# Patient Record
Sex: FEMALE | Race: WHITE | Employment: FULL TIME | URBAN - METROPOLITAN AREA
[De-identification: names, ages, dates, MRNs, and addresses within clinical notes are randomized per-mention and may not be internally consistent; named-entity substitution may affect disease eponyms.]

---

## 2017-01-03 ENCOUNTER — OFFICE VISIT (OUTPATIENT)
Dept: INTERNAL MEDICINE CLINIC | Facility: CLINIC | Age: 32
End: 2017-01-03

## 2017-01-03 ENCOUNTER — HOSPITAL ENCOUNTER (OUTPATIENT)
Dept: GENERAL RADIOLOGY | Age: 32
Discharge: HOME | End: 2017-01-03
Attending: INTERNAL MEDICINE
Payer: COMMERCIAL

## 2017-01-03 VITALS
DIASTOLIC BLOOD PRESSURE: 73 MMHG | SYSTOLIC BLOOD PRESSURE: 128 MMHG | OXYGEN SATURATION: 99 % | BODY MASS INDEX: 31.24 KG/M2 | TEMPERATURE: 99 F | RESPIRATION RATE: 16 BRPM | HEART RATE: 88 BPM | WEIGHT: 183 LBS | HEIGHT: 64 IN

## 2017-01-03 DIAGNOSIS — M79.18 PAIN IN LEFT BUTTOCK: ICD-10-CM

## 2017-01-03 DIAGNOSIS — Z32.00 ENCOUNTER FOR PREGNANCY TEST, RESULT UNKNOWN: ICD-10-CM

## 2017-01-03 DIAGNOSIS — J01.90 ACUTE NON-RECURRENT SINUSITIS, UNSPECIFIED LOCATION: Primary | ICD-10-CM

## 2017-01-03 LAB
CONTROL LINE PRESENT WITH A CLEAR BACKGROUND (YES/NO): YES YES/NO
KIT LOT #: NORMAL NUMERIC
PREGNANCY TEST, URINE: NEGATIVE

## 2017-01-03 PROCEDURE — 81025 URINE PREGNANCY TEST: CPT | Performed by: INTERNAL MEDICINE

## 2017-01-03 PROCEDURE — 99214 OFFICE O/P EST MOD 30 MIN: CPT | Performed by: INTERNAL MEDICINE

## 2017-01-03 PROCEDURE — 73502 X-RAY EXAM HIP UNI 2-3 VIEWS: CPT

## 2017-01-03 PROCEDURE — 99213 OFFICE O/P EST LOW 20 MIN: CPT | Performed by: INTERNAL MEDICINE

## 2017-01-03 RX ORDER — HYDROCODONE BITARTRATE AND ACETAMINOPHEN 5; 325 MG/1; MG/1
1 TABLET ORAL EVERY 6 HOURS PRN
Qty: 30 TABLET | Refills: 0 | Status: SHIPPED | OUTPATIENT
Start: 2017-01-03 | End: 2017-01-30

## 2017-01-03 NOTE — PROGRESS NOTES
HPI:    Patient ID: Yanet Duran is a 28year old female. Fall  The accident occurred 3 to 5 days ago. The fall occurred while walking. She fell from a height of 1 to 2 ft. She landed on concrete. There was no blood loss.  The point of impact was th for Pain. Disp: 30 tablet Rfl: 0   ClonazePAM 0.5 MG Oral Tab Take 1 tablet daily as needed for anxiety and sleep Disp:  Rfl: 2   Zolpidem Tartrate 10 MG Oral Tab Take 1 tablet (10 mg total) by mouth nightly as needed.  Disp: 30 tablet Rfl: 0   amphetamine- and no deformity. Lumbar back: Normal.   Lymphadenopathy:     She has no cervical adenopathy. Neurological: She is alert. She has normal strength. No sensory deficit.    Reflex Scores:       Patellar reflexes are 2+ on the right side and 2+ on the

## 2017-01-25 ENCOUNTER — PATIENT MESSAGE (OUTPATIENT)
Dept: INTERNAL MEDICINE CLINIC | Facility: CLINIC | Age: 32
End: 2017-01-25

## 2017-01-25 ENCOUNTER — TELEPHONE (OUTPATIENT)
Dept: INTERNAL MEDICINE CLINIC | Facility: CLINIC | Age: 32
End: 2017-01-25

## 2017-01-25 ENCOUNTER — HOSPITAL ENCOUNTER (EMERGENCY)
Facility: HOSPITAL | Age: 32
Discharge: HOME | End: 2017-01-25
Payer: COMMERCIAL

## 2017-01-25 ENCOUNTER — APPOINTMENT (OUTPATIENT)
Dept: GENERAL RADIOLOGY | Facility: HOSPITAL | Age: 32
End: 2017-01-25
Attending: PHYSICIAN ASSISTANT
Payer: COMMERCIAL

## 2017-01-25 VITALS
BODY MASS INDEX: 31.76 KG/M2 | DIASTOLIC BLOOD PRESSURE: 76 MMHG | SYSTOLIC BLOOD PRESSURE: 131 MMHG | HEART RATE: 80 BPM | OXYGEN SATURATION: 98 % | HEIGHT: 64 IN | WEIGHT: 186 LBS | TEMPERATURE: 98 F | RESPIRATION RATE: 16 BRPM

## 2017-01-25 DIAGNOSIS — M54.40 CHRONIC LOW BACK PAIN WITH SCIATICA, SCIATICA LATERALITY UNSPECIFIED, UNSPECIFIED BACK PAIN LATERALITY: Primary | ICD-10-CM

## 2017-01-25 DIAGNOSIS — M54.40 BACK PAIN OF LUMBAR REGION WITH SCIATICA: Primary | ICD-10-CM

## 2017-01-25 DIAGNOSIS — G89.29 CHRONIC LOW BACK PAIN WITH SCIATICA, SCIATICA LATERALITY UNSPECIFIED, UNSPECIFIED BACK PAIN LATERALITY: Primary | ICD-10-CM

## 2017-01-25 PROCEDURE — 99283 EMERGENCY DEPT VISIT LOW MDM: CPT

## 2017-01-25 PROCEDURE — 72110 X-RAY EXAM L-2 SPINE 4/>VWS: CPT

## 2017-01-25 RX ORDER — HYDROCODONE BITARTRATE AND ACETAMINOPHEN 5; 325 MG/1; MG/1
1-2 TABLET ORAL EVERY 6 HOURS PRN
Qty: 20 TABLET | Refills: 0 | Status: SHIPPED | OUTPATIENT
Start: 2017-01-25 | End: 2017-02-04

## 2017-01-25 RX ORDER — ORPHENADRINE CITRATE 100 MG/1
100 TABLET, EXTENDED RELEASE ORAL 2 TIMES DAILY
Qty: 10 TABLET | Refills: 0 | Status: SHIPPED | OUTPATIENT
Start: 2017-01-25 | End: 2017-02-01

## 2017-01-25 RX ORDER — METHYLPREDNISOLONE 4 MG/1
TABLET ORAL
Qty: 1 PACKAGE | Refills: 0 | Status: SHIPPED | OUTPATIENT
Start: 2017-01-25 | End: 2017-01-30

## 2017-01-25 RX ORDER — HYDROCODONE BITARTRATE AND ACETAMINOPHEN 5; 325 MG/1; MG/1
2 TABLET ORAL ONCE
Status: COMPLETED | OUTPATIENT
Start: 2017-01-25 | End: 2017-01-25

## 2017-01-25 NOTE — TELEPHONE ENCOUNTER
Patient stated her lower back pain is so bad can not even find a comfortable position lying. Her boyfriend has to help her walk and the pain is radiating down her legs and causing twitching. She was crying on the phone her pain is so bad.    Instructed n

## 2017-01-25 NOTE — TELEPHONE ENCOUNTER
Patient said that her pain is worse pain in my lower back, radiates through both hips and  both thighs. Has apt tomorrow but cannot wait to get medication. Asking if he can give her  medication till then.

## 2017-01-25 NOTE — ED PROVIDER NOTES
Patient has a history of known disc disease. She had an MRI scan last year. Patient had a fall. She was seen by her primary care doctor after the fall. Patient has had persistent pains.   She reports pain across the lower back bilaterally with some radi

## 2017-01-25 NOTE — ED INITIAL ASSESSMENT (HPI)
Pt has hx of degenerative disk disease, with back pain, today increased in pain radiating to hips and legs, needing help out of bed.

## 2017-01-25 NOTE — ED PROVIDER NOTES
Patient Seen in: [facility] Emergency Department    History   Patient presents with:  Back Pain (musculoskeletal)    Stated Complaint: back pain    HPI    50-year-old female with a history of herniated disks in her L4-L5 and L5-S1 presents to the [facility] Tab,  Take 1 tablet (10 mg total) by mouth 2 (two) times daily. alprazolam 0.25 MG Oral Tab,  Take 1 tablet by mouth as needed.        Family History   Problem Relation Age of Onset   • Cancer Mother      Lymphoma          Smoking Status: Never Smoker Skin is warm and dry. Psychiatric: She has a normal mood and affect.  Her behavior is normal. Thought content normal.             ED Course   Labs Reviewed - No data to display  XR LUMBAR SPINE (MIN 4 VIEWS) (CPT=72110) (Final result) Result time: 01/25/1 Medication List as of 1/25/2017  3:59 PM    START taking these medications    methylPREDNISolone 4 MG Oral Tablet Therapy Pack  Dosepack: use as directed on packaging, Normal, Disp-1 Package, R-0    Orphenadrine Citrate  MG Oral Tablet 12 Hr  Take 10

## 2017-01-26 ENCOUNTER — TELEPHONE (OUTPATIENT)
Dept: INTERNAL MEDICINE CLINIC | Facility: CLINIC | Age: 32
End: 2017-01-26

## 2017-01-26 DIAGNOSIS — S39.012A STRAIN, BACK, INITIAL ENCOUNTER: Primary | ICD-10-CM

## 2017-01-26 NOTE — TELEPHONE ENCOUNTER
Patient states she needs medication for her back pain there is no available appt.  With Dr. Dayron Wilson   She only has medication to last her until Sunday 1/29/2016 she states that's all the hospital will prescribe   Patient wants to know if Dr. Dayron Wilson can Aimee Duque

## 2017-01-26 NOTE — TELEPHONE ENCOUNTER
Spoke to patient, recommended to have MRI of the lumbar spine, provided him with referral to pain service, advised her that she should follow-up with me in the office taking that he will Monday if needed for evaluation, she probably will not be able to see

## 2017-01-26 NOTE — TELEPHONE ENCOUNTER
Pt said she just spoke with Dr Asya Rich- said she is to be added to her schedule on Monday 1/30- nothing in Epic  Pt said she prefers afternoon or at end of Dr Dennys Almeida day   only same day slots available

## 2017-01-26 NOTE — TELEPHONE ENCOUNTER
Patient checking on status of referral advise 3-5 days turn around   Patient states she is in a lot of pain and she only has medication for pain to last her thru Sunday  Any help in speeding up the process will be appreciated   Explained insurance has to a

## 2017-01-26 NOTE — TELEPHONE ENCOUNTER
Patient called to report that she was seen in the ED for her pain. She has verbalized urgency in getting referral to pain management to follow-up related to her ED visit. Separate encounter was started for that. Please advise.  Please call or send message v

## 2017-01-26 NOTE — TELEPHONE ENCOUNTER
X-ray negative for acute process, results discussed with patient.  Patient will begin Medrol Dosepak, muscle relaxers, and Norco as needed for breakthrough pain.  She will follow-up with primary care and her pain specialist.    Pt asking for referral for p

## 2017-01-30 ENCOUNTER — OFFICE VISIT (OUTPATIENT)
Dept: INTERNAL MEDICINE CLINIC | Facility: CLINIC | Age: 32
End: 2017-01-30

## 2017-01-30 VITALS
DIASTOLIC BLOOD PRESSURE: 89 MMHG | WEIGHT: 182 LBS | BODY MASS INDEX: 31 KG/M2 | HEART RATE: 60 BPM | SYSTOLIC BLOOD PRESSURE: 138 MMHG | RESPIRATION RATE: 18 BRPM

## 2017-01-30 DIAGNOSIS — S39.012S LUMBAR STRAIN, SEQUELA: Primary | ICD-10-CM

## 2017-01-30 DIAGNOSIS — M54.16 LUMBAR BACK PAIN WITH RADICULOPATHY AFFECTING LEFT LOWER EXTREMITY: ICD-10-CM

## 2017-01-30 PROBLEM — S39.012A LUMBAR STRAIN: Status: ACTIVE | Noted: 2017-01-30

## 2017-01-30 PROCEDURE — 99212 OFFICE O/P EST SF 10 MIN: CPT | Performed by: INTERNAL MEDICINE

## 2017-01-30 PROCEDURE — 99214 OFFICE O/P EST MOD 30 MIN: CPT | Performed by: INTERNAL MEDICINE

## 2017-01-30 RX ORDER — HYDROCODONE BITARTRATE AND ACETAMINOPHEN 5; 325 MG/1; MG/1
1 TABLET ORAL EVERY 6 HOURS PRN
Qty: 30 TABLET | Refills: 0 | Status: SHIPPED | OUTPATIENT
Start: 2017-01-30 | End: 2017-02-08

## 2017-01-30 RX ORDER — GABAPENTIN 300 MG/1
CAPSULE ORAL
Qty: 60 CAPSULE | Refills: 0 | Status: SHIPPED | OUTPATIENT
Start: 2017-01-30 | End: 2018-04-10

## 2017-01-30 NOTE — PROGRESS NOTES
HPI:    Patient ID: Jeremías Wyman is a 28year old female presents for follow-up on her low back pain.     HPI  Patient reports that 9 days ago she developed low back pain across lumbar sacral area, with radiation of the pain down the left foot on the p directed on packaging Disp: 1 Package Rfl: 0   Orphenadrine Citrate  MG Oral Tablet 12 Hr Take 100 mg by mouth 2 (two) times daily.  Disp: 10 tablet Rfl: 0   ClonazePAM 0.5 MG Oral Tab Take 1 tablet daily as needed for anxiety and sleep Disp:  Rfl: 2 walk   Skin: Skin is warm and dry. No rash noted. Psychiatric: She has a normal mood and affect.  Her behavior is normal. Judgment and thought content normal.              ASSESSMENT/PLAN:   (S39.012S) Lumbar strain, sequela  (primary encounter diagnosis)

## 2017-02-03 NOTE — PROGRESS NOTES
SEEN BY DR. Rukhsana Khan  she developed low back pain across lumbar sacral area, with radiation of the pain down the left foot on the posterior and anterior thigh.  Patient was seen at the emergency room for evaluation x-ray lumbar spine showed degenerative godoy

## 2017-02-04 ENCOUNTER — HOSPITAL ENCOUNTER (OUTPATIENT)
Dept: MRI IMAGING | Age: 32
Discharge: HOME | End: 2017-02-04
Attending: INTERNAL MEDICINE
Payer: COMMERCIAL

## 2017-02-04 DIAGNOSIS — S39.012A STRAIN, BACK, INITIAL ENCOUNTER: ICD-10-CM

## 2017-02-04 PROCEDURE — 72148 MRI LUMBAR SPINE W/O DYE: CPT

## 2017-02-08 ENCOUNTER — OFFICE VISIT (OUTPATIENT)
Dept: PAIN CLINIC | Facility: HOSPITAL | Age: 32
End: 2017-02-08
Attending: ANESTHESIOLOGY
Payer: COMMERCIAL

## 2017-02-08 VITALS
BODY MASS INDEX: 31.07 KG/M2 | SYSTOLIC BLOOD PRESSURE: 123 MMHG | HEART RATE: 95 BPM | DIASTOLIC BLOOD PRESSURE: 71 MMHG | RESPIRATION RATE: 18 BRPM | WEIGHT: 182 LBS | HEIGHT: 64 IN

## 2017-02-08 DIAGNOSIS — M54.42 ACUTE LEFT-SIDED LOW BACK PAIN WITH LEFT-SIDED SCIATICA: Primary | ICD-10-CM

## 2017-02-08 PROCEDURE — 99201 HC OUTPT EVAL AND MGNT NEW PT LEVEL 1: CPT

## 2017-02-08 RX ORDER — HYDROCODONE BITARTRATE AND ACETAMINOPHEN 7.5; 325 MG/1; MG/1
1 TABLET ORAL
COMMUNITY
Start: 2017-02-08 | End: 2017-02-08

## 2017-02-08 RX ORDER — HYDROCODONE BITARTRATE AND ACETAMINOPHEN 7.5; 325 MG/1; MG/1
1 TABLET ORAL
Qty: 30 TABLET | Refills: 0 | Status: SHIPPED | OUTPATIENT
Start: 2017-02-08 | End: 2018-04-10

## 2017-02-08 NOTE — PROGRESS NOTES
SEEN BY DR. Bob Purvis  she developed low back pain across lumbar sacral area, with radiation of the pain down the left foot on the posterior and anterior thigh.  Patient was seen at the emergency room for evaluation x-ray lumbar spine showed degenerative godoy

## 2017-02-08 NOTE — CHRONIC PAIN
[facility] HOSP - [facility]  Report of Consultation    Abel Miguel Patient Status:  Outpatient    1985 MRN [id number]   Location [address] [facility] Attending Anastacia Gordon MD   Hosp Day #  PCP Alden Crenshaw MD     Da Problem List:     Carpal tunnel syndrome     Cherry angioma     Other acne     Family history of malignant melanoma     Fatigue     Osteoarthritis of spine with radiculopathy, lumbar region     ADHD, adult residual type     Acute gastroenteritis     Alanda Scotia

## 2017-02-15 ENCOUNTER — TELEPHONE (OUTPATIENT)
Dept: PAIN CLINIC | Facility: HOSPITAL | Age: 32
End: 2017-02-15

## 2017-02-15 NOTE — TELEPHONE ENCOUNTER
Patient called to report pain despite taking Norco for pain. Patient reports that she continues to take NSAID. She states \"im taking three advil about four times a day. \" Patient states she is concerned that her pain level will increase following the inje

## 2017-05-26 ENCOUNTER — OFFICE VISIT (OUTPATIENT)
Dept: PAIN CLINIC | Facility: HOSPITAL | Age: 32
End: 2017-05-26
Attending: ANESTHESIOLOGY
Payer: COMMERCIAL

## 2017-05-26 VITALS — BODY MASS INDEX: 29.71 KG/M2 | HEIGHT: 64 IN | WEIGHT: 174 LBS

## 2017-05-26 DIAGNOSIS — M54.40 CHRONIC LOW BACK PAIN WITH SCIATICA, SCIATICA LATERALITY UNSPECIFIED, UNSPECIFIED BACK PAIN LATERALITY: Primary | ICD-10-CM

## 2017-05-26 DIAGNOSIS — G89.29 CHRONIC LOW BACK PAIN WITH SCIATICA, SCIATICA LATERALITY UNSPECIFIED, UNSPECIFIED BACK PAIN LATERALITY: Primary | ICD-10-CM

## 2017-05-26 PROCEDURE — 99211 OFF/OP EST MAY X REQ PHY/QHP: CPT

## 2017-05-26 RX ORDER — METHYLPREDNISOLONE 4 MG/1
4 TABLET ORAL ONCE
Qty: 1 PACKAGE | Refills: 0 | Status: SHIPPED | OUTPATIENT
Start: 2017-05-26 | End: 2017-05-26

## 2017-05-26 RX ORDER — HYDROCODONE BITARTRATE AND ACETAMINOPHEN 10; 325 MG/1; MG/1
1 TABLET ORAL EVERY 6 HOURS PRN
Qty: 30 TABLET | Refills: 0 | Status: SHIPPED | OUTPATIENT
Start: 2017-05-26 | End: 2017-06-25

## 2017-05-26 NOTE — PROGRESS NOTES
Patient presents to [facility] ambulatory for med eval.  We have been treating her for low back pain that radiates to the left buttock and down her left leg with sharp shooting pain. \"It feels like there is a knife in my back. \"  She has pain today at a 7/10 an

## 2017-05-26 NOTE — CHRONIC PAIN
Follow-up Note    HISTORY OF PRESENT ILLNESS:  Abel Miguel is a 28year old old female, returns to the clinic for valuation treatment of her low back pain which radiates down to her left leg primarily in the left buttock down into the back of the leg does not exacerbate the pain.   Numbness/tingling: as above  Weakness: as above  Weight Loss: Negative   Fever: Negative   Cardiovascular:  No current chest pain or palpitations   Respiratory:  No current shortness of breath   Gastrointestinal:  No active u Caffeine Concern Yes    Comment: Soda      Social History Narrative     ADVANCE CARE PLANNING:    Advance Care Plan NOT discussed. PHYSICAL EXAMINATION:  There were no vitals filed for this visit.    General: Alert and oriented x3  Affect:  NAD  CV: RRR

## 2017-12-27 ENCOUNTER — APPOINTMENT (OUTPATIENT)
Dept: GENERAL RADIOLOGY | Facility: HOSPITAL | Age: 32
End: 2017-12-27
Payer: COMMERCIAL

## 2017-12-27 ENCOUNTER — HOSPITAL ENCOUNTER (EMERGENCY)
Facility: HOSPITAL | Age: 32
Discharge: HOME | End: 2017-12-27
Attending: EMERGENCY MEDICINE
Payer: COMMERCIAL

## 2017-12-27 ENCOUNTER — TELEPHONE (OUTPATIENT)
Dept: INTERNAL MEDICINE CLINIC | Facility: CLINIC | Age: 32
End: 2017-12-27

## 2017-12-27 VITALS
WEIGHT: 180 LBS | RESPIRATION RATE: 18 BRPM | TEMPERATURE: 98 F | HEART RATE: 93 BPM | HEIGHT: 65 IN | SYSTOLIC BLOOD PRESSURE: 144 MMHG | DIASTOLIC BLOOD PRESSURE: 69 MMHG | OXYGEN SATURATION: 100 % | BODY MASS INDEX: 29.99 KG/M2

## 2017-12-27 DIAGNOSIS — S92.902A CLOSED FRACTURE OF LEFT FOOT, INITIAL ENCOUNTER: ICD-10-CM

## 2017-12-27 DIAGNOSIS — S92.354A NONDISPLACED FRACTURE OF FIFTH METATARSAL BONE, RIGHT FOOT, INITIAL ENCOUNTER FOR CLOSED FRACTURE: Primary | ICD-10-CM

## 2017-12-27 DIAGNOSIS — S92.901A FRACTURE, FOOT, RIGHT, CLOSED, INITIAL ENCOUNTER: Primary | ICD-10-CM

## 2017-12-27 PROCEDURE — 99284 EMERGENCY DEPT VISIT MOD MDM: CPT

## 2017-12-27 PROCEDURE — 28470 CLTX METATARSAL FX WO MNP EA: CPT

## 2017-12-27 PROCEDURE — 99283 EMERGENCY DEPT VISIT LOW MDM: CPT

## 2017-12-27 PROCEDURE — 73630 X-RAY EXAM OF FOOT: CPT | Performed by: EMERGENCY MEDICINE

## 2017-12-27 RX ORDER — IBUPROFEN 600 MG/1
600 TABLET ORAL EVERY 8 HOURS PRN
Qty: 30 TABLET | Refills: 0 | Status: SHIPPED | OUTPATIENT
Start: 2017-12-27 | End: 2018-01-03

## 2017-12-27 RX ORDER — HYDROCODONE BITARTRATE AND ACETAMINOPHEN 5; 325 MG/1; MG/1
1-2 TABLET ORAL EVERY 4 HOURS PRN
Qty: 20 TABLET | Refills: 0 | Status: SHIPPED | OUTPATIENT
Start: 2017-12-27 | End: 2018-01-03

## 2017-12-27 NOTE — TELEPHONE ENCOUNTER
Patient requesting referral be added to chart for Dr. Alex Garcia.  Patient has broken foot and has appt scheduled for 01/04/2018    Please Advise

## 2017-12-27 NOTE — TELEPHONE ENCOUNTER
Spoke to patient, provided her with another podiatry office number and names, may be she can see them sooner, sounds like she has not appropriate shoe to wear dispensed in the emergency room to her

## 2017-12-27 NOTE — ED INITIAL ASSESSMENT (HPI)
While dancing she did a leap and landed onto her right foot this was Sunday night  She noted bruising on the lateral aspect of her foot.  C/O pain with ambulation    She has taken tramadol, naprosyn, ibuprofen, and  tylenol for the pain with no relief

## 2017-12-27 NOTE — ED NOTES
DC instructions and RXs handed to pt. No distress noted. Pt denies any further needs prior to DC. Ace wrap, post op shoe and crutch teaching completed by Pilo SMALL. Pt thanks staff for care.

## 2017-12-27 NOTE — TELEPHONE ENCOUNTER
Pt called in stating she was able to get an appt with [facility] for 12/28 10:40am. Pt states that the referral needs to be sent to the office prior. The office Fax# [phone]. Please advise.

## 2017-12-27 NOTE — ED PROVIDER NOTES
Patient Seen in: [facility] Emergency Department    History   Patient presents with:  Lower Extremity Injury (musculoskeletal)    Stated Complaint: right foot pain    HPI    50-year-old female presents to the emergency department stating that she wa lateral aspect of the ankle. Medial and lateral malleolus are nontender. The toes themselves are atraumatic although there is decreased active range of motion secondary to pain.     ED Course   Labs Reviewed - No data to display    ED Course as of Dec 27

## 2017-12-27 NOTE — TELEPHONE ENCOUNTER
Spoke to patient, referral placed, spoke to manage care they will fax a referral to the doctors office

## 2018-02-02 ENCOUNTER — NURSE TRIAGE (OUTPATIENT)
Dept: OTHER | Age: 33
End: 2018-02-02

## 2018-02-02 RX ORDER — OSELTAMIVIR PHOSPHATE 75 MG/1
75 CAPSULE ORAL 2 TIMES DAILY
Qty: 10 CAPSULE | Refills: 0 | Status: SHIPPED | OUTPATIENT
Start: 2018-02-02 | End: 2018-04-10

## 2018-02-02 NOTE — TELEPHONE ENCOUNTER
Action Requested: Summary for Provider     []  Critical Lab, Recommendations Needed  [] Need Additional Advice  []   FYI    []   Need Orders  [x] Need Medications Sent to Pharmacy  []  Other     SUMMARY: Monet Christianson pt stated that she did not get the flu milo

## 2018-02-02 NOTE — TELEPHONE ENCOUNTER
Spoke to patient, she has been around person with flow, I advised her that I will start her on Tamiflu, but if she has painful cough and feels like she is getting worse she needs to make an effort and see physician immediate care center available, or emerg

## 2018-04-10 PROCEDURE — 87624 HPV HI-RISK TYP POOLED RSLT: CPT | Performed by: OBSTETRICS & GYNECOLOGY

## 2018-04-10 PROCEDURE — 88175 CYTOPATH C/V AUTO FLUID REDO: CPT | Performed by: OBSTETRICS & GYNECOLOGY

## 2018-07-02 PROCEDURE — 86780 TREPONEMA PALLIDUM: CPT | Performed by: OBSTETRICS & GYNECOLOGY

## 2018-07-02 PROCEDURE — 87340 HEPATITIS B SURFACE AG IA: CPT | Performed by: OBSTETRICS & GYNECOLOGY

## 2018-07-02 PROCEDURE — 86803 HEPATITIS C AB TEST: CPT | Performed by: OBSTETRICS & GYNECOLOGY

## 2018-07-02 PROCEDURE — 87389 HIV-1 AG W/HIV-1&-2 AB AG IA: CPT | Performed by: OBSTETRICS & GYNECOLOGY

## 2020-11-24 DIAGNOSIS — Z11.59 SPECIAL SCREENING EXAMINATION FOR UNSPECIFIED VIRAL DISEASE: Primary | ICD-10-CM

## 2020-11-30 ENCOUNTER — LAB SERVICES (OUTPATIENT)
Dept: LAB | Age: 35
End: 2020-11-30

## 2020-11-30 DIAGNOSIS — Z11.59 SPECIAL SCREENING EXAMINATION FOR UNSPECIFIED VIRAL DISEASE: ICD-10-CM

## 2020-11-30 LAB
SARS-COV-2 RNA RESP QL NAA+PROBE: NOT DETECTED
SERVICE CMNT-IMP: NORMAL
SPECIMEN SOURCE: NORMAL

## 2020-11-30 PROCEDURE — U0003 INFECTIOUS AGENT DETECTION BY NUCLEIC ACID (DNA OR RNA); SEVERE ACUTE RESPIRATORY SYNDROME CORONAVIRUS 2 (SARS-COV-2) (CORONAVIRUS DISEASE [COVID-19]), AMPLIFIED PROBE TECHNIQUE, MAKING USE OF HIGH THROUGHPUT TECHNOLOGIES AS DESCRIBED BY CMS-2020-01-R: HCPCS | Performed by: INTERNAL MEDICINE

## (undated) NOTE — ED AVS SNAPSHOT
[facility] Emergency Department    [place]  [address] [address]    Phone:  [phone]    Fax:  [phone] [address] [facility]   MRN: [id number]    Department:  [facility] Emergency Department   Date of Visit:  1/ IF THERE IS ANY CHANGE OR WORSENING OF YOUR CONDITION, CALL YOUR PRIMARY CARE PHYSICIAN AT ONCE OR RETURN IMMEDIATELY TO THE EMERGENCY DEPARTMENT.     If you have been prescribed any medication(s), please fill your prescription right away and begin taking t

## (undated) NOTE — ED AVS SNAPSHOT
[facility] Emergency Department    [place]  [address] [address]    Phone:  [phone]    Fax:  [phone] [facility] [facility]   MRN: [id number]    Department:  [facility] Emergency Department   Date of Visit:  1/ - HYDROcodone-acetaminophen 5-325 MG Tabs  - methylPREDNISolone 4 MG Tbpk  - Orphenadrine Citrate  MG Tb12              Discharge Instructions       Rest  Warm compresses   Medrol Dosepak anti-inflammatory  Norflex for spasm    Norco for severe pain primary care or specialist physician will see patients referred from the [facility] Emergency Department. Follow-up care is at the discretion of that Physician.     IF THERE IS ANY CHANGE OR WORSENING OF YOUR CONDITION, CALL YOUR PRIMARY CARE PHYSICIAN - If you have concerns related to behavioral health issues or thoughts of harming yourself, contact [address] [place] [address] at [phone].     - If you don’t have insurance, Shelby Ng has partnered with Patient cicayda Lis If you've recently had a stay at the Hospital you can access your discharge instructions in [employer] by going to Visits < Admission Summaries.  If you've been to the Emergency Department or your doctor's office, you can view your past visit information in My

## (undated) NOTE — ED AVS SNAPSHOT
Socrates Cho   MRN: [id number]    Department:  [facility] Emergency Department   Date of Visit:  12/27/2017           Disclosure     Insurance plans vary and the physician(s) referred by the ER may not be covered by your plan.  Please contact y tell this physician (or your personal doctor if your instructions are to return to your personal doctor) about any new or lasting problems. The primary care or specialist physician will see patients referred from the [facility] Emergency Department.  Dc Patel

## (undated) NOTE — Clinical Note
1/3/2017              [facility] [address]         To whom it may concern,      This is to certify that Ms Eliana Beasley was seen and evaluated today.  She should be excused from work today due t

## (undated) NOTE — MR AVS SNAPSHOT
[facility]  [address] [place] [address], [address] [facility]  Silviano Found               Thank you for choosing us for your health care visit with Robert Arriola MD.  We are glad to serve you and happy to provide you with this summary of yo breastfeeding and your exam requires an IV Contrast (Gadolinium) injection, you need to stop breastfeeding for 24-48 hours after the procedure.      IF A CHILD is scheduled for this procedure, parents should be advised that they will not be able to accompan gabapentin 300 MG Caps   Start form 1tab po at bedtime  For 1 week and may continue  Twice a day   Commonly known as:  NEURONTIN           * HYDROcodone-acetaminophen 5-325 MG Tabs   Take 1-2 tablets by mouth every 6 (six) hours as needed.    Commonly know Please consider the following Lifestyle Modifications. Also, please return for a follow-up Blood Pressure Check in 1 month.      Lifestyle Modification Recommendations:    Modification Recommendation   Weight Reduction Maintain normal body weight (body mas Get your heart pumping – brisk walking, biking, swimming Even 10 minute increments are effective and add up over the week   2 ½ hours per week – spread out over time Use a pasquale to keep you motivated   Don’t forget strength training with weights and resist

## (undated) NOTE — MR AVS SNAPSHOT
[facility] [address] [address]  [phone]  [phone]               Thank you for choosing us for your health care visit with Ansel Burkitt, MD.  We are glad to serve you and happy to provide you with * HYDROcodone-acetaminophen  MG Tabs   Take 1 tablet by mouth every 6 (six) hours as needed for Pain. What changed: You were already taking a medication with the same name, and this prescription was added.  Make sure you understand how and when to Visit [facility] online at  [email or web address] [email or web address]

## (undated) NOTE — Clinical Note
1/30/2017              [address]         PATIENT WAS SEE IN THE OFFICE ON 1-30-17      Sincerely,    Christopher Jordan MD  [facility], [address] [facility], [address]

## (undated) NOTE — MR AVS SNAPSHOT
[address], [address]  [address] [facility]  [phone]               Thank you for choosing us for your health care visit with Khushbu Luna MD.  We are glad to serve you and happy to provide you with this s Commonly known as:  AMBIEN                Where to Get Your Medications      You can get these medications from any pharmacy     Bring a paper prescription for each of these medications    - HYDROcodone-acetaminophen 5-325 MG Tabs            Today's Orders URINE PREGNANCY TEST      Component Value Standard Range & Units    Pregnancy Test, Urine Negative     Control Line Present with a clear background (yes/no) yes Yes/No    Kit Lot # [id number] Numeric    Kit Expiration Date 11-3-17 Date    1/3/2017